# Patient Record
Sex: MALE | NOT HISPANIC OR LATINO | URBAN - METROPOLITAN AREA
[De-identification: names, ages, dates, MRNs, and addresses within clinical notes are randomized per-mention and may not be internally consistent; named-entity substitution may affect disease eponyms.]

---

## 2017-04-04 ENCOUNTER — IMPORTED ENCOUNTER (OUTPATIENT)
Dept: URBAN - METROPOLITAN AREA CLINIC 1 | Facility: CLINIC | Age: 67
End: 2017-04-04

## 2017-04-04 PROBLEM — H47.20: Noted: 2017-04-04

## 2017-04-04 PROBLEM — H44.522: Noted: 2017-04-04

## 2017-04-04 PROBLEM — H25.811: Noted: 2017-04-04

## 2017-04-04 PROCEDURE — 92014 COMPRE OPH EXAM EST PT 1/>: CPT

## 2017-04-04 PROCEDURE — 92015 DETERMINE REFRACTIVE STATE: CPT

## 2017-04-04 NOTE — PATIENT DISCUSSION
1.  Cataract OD: Visually Significant discussed the risks benefits alternatives and limitations of cataract surgery. The patient stated a full understanding and a desire to proceed with the procedure. The patient will need to return for preop appointment with cataract measurements and to have any additional questions answered and start pre-operative eye drops as directed. Not MF Candidate. Phaco PCL OD(Otherwise follow-up 6 mo 30 glare) 2. 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 Pt to continue Travatan Z OD QHS. Compliance with medication discussed with pateint today. 3.  Phthisis OS -- Monocular safety precautions. Increase Pred to TID OS. (Pt only using BID; Noncompliance with meds discussed- Compliance urged) Letter to PCP Return for an appointment with Dr. Taylor Joya for AS/HP.

## 2017-06-26 ENCOUNTER — IMPORTED ENCOUNTER (OUTPATIENT)
Dept: URBAN - METROPOLITAN AREA CLINIC 1 | Facility: CLINIC | Age: 67
End: 2017-06-26

## 2017-06-26 PROBLEM — H25.811: Noted: 2017-06-26

## 2017-06-26 PROCEDURE — 92136 OPHTHALMIC BIOMETRY: CPT

## 2017-06-26 NOTE — PATIENT DISCUSSION
1.  Cataract OD: Visually Significant discussed the risks benefits alternatives and limitations of cataract surgery. The patient stated a full understanding and a desire to proceed with the procedure. The patient will need to return for preop appointment with cataract measurements and to have any additional questions answered and start pre-operative eye drops as directed. Phaco PCL OD (Rolando muñoz Standard technique)Return for an appointment for Return as scheduled with Dr. Deepak Paulson.

## 2017-07-05 ENCOUNTER — IMPORTED ENCOUNTER (OUTPATIENT)
Dept: URBAN - METROPOLITAN AREA CLINIC 1 | Facility: CLINIC | Age: 67
End: 2017-07-05

## 2017-07-06 ENCOUNTER — IMPORTED ENCOUNTER (OUTPATIENT)
Dept: URBAN - METROPOLITAN AREA CLINIC 1 | Facility: CLINIC | Age: 67
End: 2017-07-06

## 2017-07-06 PROBLEM — Z96.1: Noted: 2017-07-06

## 2017-07-06 PROCEDURE — 99024 POSTOP FOLLOW-UP VISIT: CPT

## 2017-07-06 NOTE — PATIENT DISCUSSION
POD#1 CE/IOL Standard OD doing well. (monocular pt)Continue all 3 gtts as prescribed and until gone. Ocuflox TIDPrednisolone BIDIlevro QDPost op Warnings Reiterated RTC as scheduled

## 2017-07-13 ENCOUNTER — IMPORTED ENCOUNTER (OUTPATIENT)
Dept: URBAN - METROPOLITAN AREA CLINIC 1 | Facility: CLINIC | Age: 67
End: 2017-07-13

## 2017-07-13 PROBLEM — H33.20: Noted: 2017-07-13

## 2017-07-13 PROBLEM — Z96.1: Noted: 2017-07-13

## 2017-07-13 PROCEDURE — 92012 INTRM OPH EXAM EST PATIENT: CPT

## 2017-07-13 NOTE — PATIENT DISCUSSION
1.  RD OD- Condition discussed w/ pt. Refer to Dr Betty Pagan today. 2.  POW#1  CE/IOL Standard OD. Slight inflammation. (monocular pt.)Discontinue OcufloxContinue Prednisolone OD BID OS TID. Continue Ilevro QD until gone. 3.   RTC w/ PMG as scheduled

## 2017-07-28 ENCOUNTER — IMPORTED ENCOUNTER (OUTPATIENT)
Dept: URBAN - METROPOLITAN AREA CLINIC 1 | Facility: CLINIC | Age: 67
End: 2017-07-28

## 2017-07-28 PROBLEM — Z96.1: Noted: 2017-07-28

## 2017-07-28 PROCEDURE — 99024 POSTOP FOLLOW-UP VISIT: CPT

## 2017-07-28 NOTE — PATIENT DISCUSSION
POW#3 Phaco/ PCL OD (Standard) ? Endophthalmitis vs. Inflammation. Patient has received injections by  [facility] with Lab results Pending. Vitreous/ AC Clear today OD. Use Prednisolone TID OD Use Neomycin/ Dex TID OD; D/c Atropine OD. D/c Travatan Z. Return for an appointment in 1 mo repeat KR OD with Dr. Angie Quesada.

## 2017-09-08 ENCOUNTER — IMPORTED ENCOUNTER (OUTPATIENT)
Dept: URBAN - METROPOLITAN AREA CLINIC 1 | Facility: CLINIC | Age: 67
End: 2017-09-08

## 2017-09-08 PROBLEM — Z96.1: Noted: 2017-09-08

## 2017-09-08 PROCEDURE — 99024 POSTOP FOLLOW-UP VISIT: CPT

## 2017-09-08 NOTE — PATIENT DISCUSSION
POM#1 CE/IOL OD doing well. Stable IOP off drops. MRX given. D/C Prednisolone ODContinue Prednisolone OS BID. Return for an appointment for a 27 in March with Dr. Rubén Fitzgerald.

## 2018-03-16 ENCOUNTER — IMPORTED ENCOUNTER (OUTPATIENT)
Dept: URBAN - METROPOLITAN AREA CLINIC 1 | Facility: CLINIC | Age: 68
End: 2018-03-16

## 2018-03-16 PROBLEM — H47.291: Noted: 2018-03-16

## 2018-03-16 PROCEDURE — 92014 COMPRE OPH EXAM EST PT 1/>: CPT

## 2018-03-16 NOTE — PATIENT DISCUSSION
1.  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 Continue Travatan Z OD QHS. Compliance with medication discussed with pateint today. 2.  Phthisis OS -- Monocular safety precautions. Continue Pred TID OS. 3. Pseudophakia OD- (Standard) Letter to PCP Return for an appointment in 6 mo 10 dfe with Dr. Nina Ferguson.

## 2018-09-17 ENCOUNTER — IMPORTED ENCOUNTER (OUTPATIENT)
Dept: URBAN - METROPOLITAN AREA CLINIC 1 | Facility: CLINIC | Age: 68
End: 2018-09-17

## 2018-09-17 PROBLEM — Z96.1: Noted: 2018-09-17

## 2018-09-17 PROBLEM — H47.291: Noted: 2018-09-17

## 2018-09-17 PROBLEM — H44.50: Noted: 2018-09-17

## 2018-09-17 PROCEDURE — 99213 OFFICE O/P EST LOW 20 MIN: CPT

## 2018-09-17 NOTE — PATIENT DISCUSSION
Patient not complaining of pain or significant discharge. Told to call immediately if other eye becomes sensitive to light or painful.

## 2018-09-17 NOTE — PATIENT DISCUSSION
1.  Traumatic Optic Atrophy OD- Improved IOP OD off drops. Continue to observe off Travatan. 2.  Phthisis OS- Monocular safety precautions. Continue Pred BID OS as pt is using. 3. Pseudophakia OD- (Standard) 4. Return for an appointment for 30 in 6 months with Dr. Ariel Campo.

## 2019-03-18 ENCOUNTER — IMPORTED ENCOUNTER (OUTPATIENT)
Dept: URBAN - METROPOLITAN AREA CLINIC 1 | Facility: CLINIC | Age: 69
End: 2019-03-18

## 2019-03-18 PROBLEM — H16.141: Noted: 2019-03-18

## 2019-03-18 PROBLEM — Z96.1: Noted: 2019-03-18

## 2019-03-18 PROBLEM — H44.522: Noted: 2019-03-18

## 2019-03-18 PROBLEM — H04.121: Noted: 2019-03-18

## 2019-03-18 PROCEDURE — 92014 COMPRE OPH EXAM EST PT 1/>: CPT

## 2019-03-18 NOTE — PATIENT DISCUSSION
1.  Traumatic Optic Atrophy OD - Improved IOP OD off drops. Continue to observe off Travatan. 2.  Phthisis OS -- condition discussed with patient3. Dry Eye OD - PEK resolved. ATs TID OD routinely 4. Pseudophakia OD - (Standard) Patient defers the refraction at today's visitReturn for an appointment in 6 months 10 with Dr. Peter Knutson.

## 2019-09-20 ENCOUNTER — IMPORTED ENCOUNTER (OUTPATIENT)
Dept: URBAN - METROPOLITAN AREA CLINIC 1 | Facility: CLINIC | Age: 69
End: 2019-09-20

## 2019-09-20 PROBLEM — H04.121: Noted: 2019-09-20

## 2019-09-20 PROBLEM — Z96.1: Noted: 2019-09-20

## 2019-09-20 PROBLEM — H44.522: Noted: 2019-09-20

## 2019-09-20 PROCEDURE — 99213 OFFICE O/P EST LOW 20 MIN: CPT

## 2019-09-20 NOTE — PATIENT DISCUSSION
1.  Dry Eye OD - Recommend ATs TID OD routinely 2. Pseudophakia OD - (Standard) 3. Phthisis OS - Stable. cont Pred QOD OS4.  H/o Traumatic Optic Atrophy OD - Continue to observe off Travatan. Return for an appointment in 6 months 27 with Dr. Omar James.

## 2020-06-05 ENCOUNTER — IMPORTED ENCOUNTER (OUTPATIENT)
Dept: URBAN - METROPOLITAN AREA CLINIC 1 | Facility: CLINIC | Age: 70
End: 2020-06-05

## 2020-06-05 PROBLEM — Z96.1: Noted: 2020-06-05

## 2020-06-05 PROBLEM — H04.121: Noted: 2020-06-05

## 2020-06-05 PROCEDURE — 92014 COMPRE OPH EXAM EST PT 1/>: CPT

## 2020-06-05 NOTE — PATIENT DISCUSSION
1.  Dry Eye OD - Recommend ATs TID OD routinely 2. Pseudophakia OD - (Cristian) 3. Phthisis OS - Slight increase in discomfort OS. Increase Pred BID OS4.  H/o Traumatic Optic Atrophy OD - Continue to observe off Travatan. Return for an appointment in 6 months 10 with Dr. Anjana Marley.

## 2020-12-04 ENCOUNTER — IMPORTED ENCOUNTER (OUTPATIENT)
Dept: URBAN - METROPOLITAN AREA CLINIC 1 | Facility: CLINIC | Age: 70
End: 2020-12-04

## 2020-12-04 PROBLEM — H04.121: Noted: 2020-12-04

## 2020-12-04 PROBLEM — H16.141: Noted: 2020-12-04

## 2020-12-04 PROCEDURE — 99213 OFFICE O/P EST LOW 20 MIN: CPT

## 2020-12-04 NOTE — PATIENT DISCUSSION
1.  SOLE w/ PEK OD- Recommend increase ATs TID OD routinely 2. Pseudophakia OD - (Standard) 3. Phthisis OS - Continue Pred BID OS4.  H/o Traumatic Optic Atrophy OD - Continue to observe off Travatan. Return for an appointment in 6 months 27 with Dr. Weston Coleman.

## 2020-12-04 NOTE — PATIENT DISCUSSION
Pseudophakia OD - (Standard) 3. Phthisis OS - Continue Pred BID OS4.  H/o Traumatic Optic Atrophy OD - Continue to observe off Travatan.

## 2021-06-04 ENCOUNTER — IMPORTED ENCOUNTER (OUTPATIENT)
Dept: URBAN - METROPOLITAN AREA CLINIC 1 | Facility: CLINIC | Age: 71
End: 2021-06-04

## 2021-06-04 PROBLEM — H16.143: Noted: 2021-06-04

## 2021-06-04 PROBLEM — Z96.1: Noted: 2021-06-04

## 2021-06-04 PROBLEM — H16.141: Noted: 2021-06-04

## 2021-06-04 PROBLEM — H04.121: Noted: 2021-06-04

## 2021-06-04 PROBLEM — H04.123: Noted: 2021-06-04

## 2021-06-04 PROCEDURE — 99214 OFFICE O/P EST MOD 30 MIN: CPT

## 2021-06-04 NOTE — PATIENT DISCUSSION
1.  SOLE w/ PEK OD- PEK improved. Recommend cont ATs TID OD routinely 2. Pseudophakia OD - (Standard) 3. Phthisis OS - Continue Pred BID OS. 4. Traumatic Optic Atrophy OD - Stable. Continue to observe off Travatan. Patient deferred [facility] Rx today. Return for an appointment in 6 months 10 with Dr. Aisha Odom.

## 2021-12-06 ENCOUNTER — IMPORTED ENCOUNTER (OUTPATIENT)
Dept: URBAN - METROPOLITAN AREA CLINIC 1 | Facility: CLINIC | Age: 71
End: 2021-12-06

## 2021-12-06 PROBLEM — H04.121: Noted: 2021-12-06

## 2021-12-06 PROBLEM — H16.141: Noted: 2021-12-06

## 2021-12-06 PROCEDURE — 92012 INTRM OPH EXAM EST PATIENT: CPT

## 2021-12-06 NOTE — PATIENT DISCUSSION
1.  Dry Eye OD - PEK resolved. Recommend cont ATs TID OD routinely 2. Pseudophakia OD - (Standard) 3. Phthisis OS - Non-compliant with Pred however pt not having any pain. OK to remain off Pred. Can consider restart if pt becomes symptomatic. 4.  H/o Traumatic Optic Atrophy OD - Stable. Continue to observe off Travatan. Return for an appointment in 6 months 27 with Dr. Kaley Ratliff.

## 2022-04-02 ASSESSMENT — VISUAL ACUITY
OD_CC: 20/150
OD_GLARE: 20/400
OD_PH: SC 20/150
OD_SC: 20/150
OD_CC: CF@2'
OD_SC: 20/100-1
OD_CC: 20/400
OD_SC: 20/100
OD_CC: 20/200
OD_SC: 20/100
OD_SC: J16
OD_SC: 20/150
OD_CC: 20/150
OD_SC: 20/150
OD_SC: 20/100
OD_SC: 20/150

## 2022-04-02 ASSESSMENT — TONOMETRY
OD_IOP_MMHG: 13
OS_IOP_MMHG: 10
OS_IOP_MMHG: 16
OD_IOP_MMHG: 12
OD_IOP_MMHG: 16
OD_IOP_MMHG: 18
OD_IOP_MMHG: 21
OD_IOP_MMHG: 15
OD_IOP_MMHG: 16
OD_IOP_MMHG: 14
OD_IOP_MMHG: 17
OD_IOP_MMHG: 16
OD_IOP_MMHG: 15
OD_IOP_MMHG: 16
OD_IOP_MMHG: 13

## 2022-06-13 ENCOUNTER — COMPREHENSIVE EXAM (OUTPATIENT)
Dept: URBAN - METROPOLITAN AREA CLINIC 1 | Facility: CLINIC | Age: 72
End: 2022-06-13

## 2022-06-13 DIAGNOSIS — H16.141: ICD-10-CM

## 2022-06-13 DIAGNOSIS — Z96.1: ICD-10-CM

## 2022-06-13 DIAGNOSIS — H44.522: ICD-10-CM

## 2022-06-13 DIAGNOSIS — H04.121: ICD-10-CM

## 2022-06-13 DIAGNOSIS — H47.20: ICD-10-CM

## 2022-06-13 PROCEDURE — 92014 COMPRE OPH EXAM EST PT 1/>: CPT

## 2022-06-13 ASSESSMENT — VISUAL ACUITY: OD_CC: 20/150

## 2022-06-13 ASSESSMENT — TONOMETRY: OD_IOP_MMHG: 19

## 2023-01-17 ENCOUNTER — FOLLOW UP (OUTPATIENT)
Dept: URBAN - METROPOLITAN AREA CLINIC 1 | Facility: CLINIC | Age: 73
End: 2023-01-17

## 2023-01-17 DIAGNOSIS — H26.491: ICD-10-CM

## 2023-01-17 DIAGNOSIS — H16.141: ICD-10-CM

## 2023-01-17 DIAGNOSIS — H35.371: ICD-10-CM

## 2023-01-17 DIAGNOSIS — H04.121: ICD-10-CM

## 2023-01-17 PROCEDURE — 99213 OFFICE O/P EST LOW 20 MIN: CPT

## 2023-01-17 ASSESSMENT — TONOMETRY: OD_IOP_MMHG: 16

## 2023-01-17 ASSESSMENT — VISUAL ACUITY
OD_SC: J10
OD_CC: 20/100

## 2023-07-20 ENCOUNTER — COMPREHENSIVE EXAM (OUTPATIENT)
Dept: URBAN - METROPOLITAN AREA CLINIC 1 | Facility: CLINIC | Age: 73
End: 2023-07-20

## 2023-07-20 DIAGNOSIS — H04.121: ICD-10-CM

## 2023-07-20 DIAGNOSIS — H26.491: ICD-10-CM

## 2023-07-20 DIAGNOSIS — H44.522: ICD-10-CM

## 2023-07-20 DIAGNOSIS — H47.20: ICD-10-CM

## 2023-07-20 DIAGNOSIS — H16.141: ICD-10-CM

## 2023-07-20 DIAGNOSIS — H35.371: ICD-10-CM

## 2023-07-20 DIAGNOSIS — Z96.1: ICD-10-CM

## 2023-07-20 PROCEDURE — 99214 OFFICE O/P EST MOD 30 MIN: CPT

## 2023-07-20 PROCEDURE — 92015 DETERMINE REFRACTIVE STATE: CPT

## 2023-07-20 ASSESSMENT — TONOMETRY: OD_IOP_MMHG: 16

## 2023-07-20 ASSESSMENT — VISUAL ACUITY: OD_SC: 20/150-1

## 2024-01-25 ENCOUNTER — COMPREHENSIVE EXAM (OUTPATIENT)
Dept: URBAN - METROPOLITAN AREA CLINIC 1 | Facility: CLINIC | Age: 74
End: 2024-01-25

## 2024-01-25 DIAGNOSIS — H26.491: ICD-10-CM

## 2024-01-25 DIAGNOSIS — H44.522: ICD-10-CM

## 2024-01-25 DIAGNOSIS — H47.20: ICD-10-CM

## 2024-01-25 DIAGNOSIS — H04.121: ICD-10-CM

## 2024-01-25 PROCEDURE — 99213 OFFICE O/P EST LOW 20 MIN: CPT

## 2024-01-25 ASSESSMENT — TONOMETRY: OD_IOP_MMHG: 17

## 2024-01-25 ASSESSMENT — VISUAL ACUITY: OD_CC: 20/100+1

## 2024-08-01 ENCOUNTER — COMPREHENSIVE EXAM (OUTPATIENT)
Dept: URBAN - METROPOLITAN AREA CLINIC 1 | Facility: CLINIC | Age: 74
End: 2024-08-01

## 2024-08-01 DIAGNOSIS — H16.141: ICD-10-CM

## 2024-08-01 DIAGNOSIS — H26.491: ICD-10-CM

## 2024-08-01 DIAGNOSIS — H47.291: ICD-10-CM

## 2024-08-01 DIAGNOSIS — H44.522: ICD-10-CM

## 2024-08-01 DIAGNOSIS — H35.371: ICD-10-CM

## 2024-08-01 DIAGNOSIS — H04.121: ICD-10-CM

## 2024-08-01 DIAGNOSIS — Z96.1: ICD-10-CM

## 2024-08-01 PROCEDURE — 99214 OFFICE O/P EST MOD 30 MIN: CPT

## 2024-08-01 PROCEDURE — 92133 CPTRZD OPH DX IMG PST SGM ON: CPT

## 2024-08-01 ASSESSMENT — TONOMETRY: OD_IOP_MMHG: 16

## 2024-08-01 ASSESSMENT — VISUAL ACUITY
OD_CC: 20/100
OD_BAT: 20/400

## 2025-05-02 ENCOUNTER — FOLLOW UP (OUTPATIENT)
Age: 75
End: 2025-05-02

## 2025-05-02 DIAGNOSIS — H04.121: ICD-10-CM

## 2025-05-02 PROCEDURE — 99213 OFFICE O/P EST LOW 20 MIN: CPT
